# Patient Record
Sex: FEMALE | Race: WHITE | NOT HISPANIC OR LATINO | URBAN - METROPOLITAN AREA
[De-identification: names, ages, dates, MRNs, and addresses within clinical notes are randomized per-mention and may not be internally consistent; named-entity substitution may affect disease eponyms.]

---

## 2017-04-12 ENCOUNTER — THERAPY VISIT (OUTPATIENT)
Dept: PHYSICAL THERAPY | Facility: CLINIC | Age: 67
End: 2017-04-12
Payer: MEDICARE

## 2017-04-12 DIAGNOSIS — M25.551 HIP PAIN, RIGHT: Primary | ICD-10-CM

## 2017-04-12 PROCEDURE — 97162 PT EVAL MOD COMPLEX 30 MIN: CPT | Mod: GP | Performed by: PHYSICAL THERAPIST

## 2017-04-12 PROCEDURE — G8979 MOBILITY GOAL STATUS: HCPCS | Mod: GP | Performed by: PHYSICAL THERAPIST

## 2017-04-12 PROCEDURE — 97110 THERAPEUTIC EXERCISES: CPT | Mod: GP | Performed by: PHYSICAL THERAPIST

## 2017-04-12 PROCEDURE — G8978 MOBILITY CURRENT STATUS: HCPCS | Mod: GP | Performed by: PHYSICAL THERAPIST

## 2017-04-12 ASSESSMENT — ACTIVITIES OF DAILY LIVING (ADL)
HOS_ADL_COUNT: 17
KNEE_ACTIVITY_OF_DAILY_LIVING_SUM: 55
WALKING_15_MINUTES_OR_GREATER: UNABLE TO DO
HOW_WOULD_YOU_RATE_THE_OVERALL_FUNCTION_OF_YOUR_KNEE_DURING_YOUR_USUAL_DAILY_ACTIVITIES?: ABNORMAL
STEPPING_UP_AND_DOWN_CURBS: NO DIFFICULTY AT ALL
HOS_ADL_HIGHEST_POTENTIAL_SCORE: 68
KNEEL ON THE FRONT OF YOUR KNEE: ACTIVITY IS NOT DIFFICULT
PAIN: THE SYMPTOM AFFECTS MY ACTIVITY MODERATELY
STANDING_FOR_15_MINUTES: EXTREME DIFFICULTY
SIT WITH YOUR KNEE BENT: ACTIVITY IS NOT DIFFICULT
SITTING_FOR_15_MINUTES: NO DIFFICULTY AT ALL
PUTTING_ON_SOCKS_AND_SHOES: NO DIFFICULTY AT ALL
SWELLING: I DO NOT HAVE THE SYMPTOM
WEAKNESS: I DO NOT HAVE THE SYMPTOM
HOS_ADL_ITEM_SCORE_TOTAL: 37
ROLLING_OVER_IN_BED: NO DIFFICULTY AT ALL
GO UP STAIRS: ACTIVITY IS SOMEWHAT DIFFICULT
HOW_WOULD_YOU_RATE_THE_CURRENT_FUNCTION_OF_YOUR_KNEE_DURING_YOUR_USUAL_DAILY_ACTIVITIES_ON_A_SCALE_FROM_0_TO_100_WITH_100_BEING_YOUR_LEVEL_OF_KNEE_FUNCTION_PRIOR_TO_YOUR_INJURY_AND_0_BEING_THE_INABILITY_TO_PERFORM_ANY_OF_YOUR_USUAL_DAILY_ACTIVITIES?: 70
HOS_ADL_SCORE(%): 54.41
DEEP_SQUATTING: EXTREME DIFFICULTY
GOING_DOWN_1_FLIGHT_OF_STAIRS: SLIGHT DIFFICULTY
GOING_UP_1_FLIGHT_OF_STAIRS: MODERATE DIFFICULTY
AS_A_RESULT_OF_YOUR_KNEE_INJURY,_HOW_WOULD_YOU_RATE_YOUR_CURRENT_LEVEL_OF_DAILY_ACTIVITY?: ABNORMAL
SQUAT: ACTIVITY IS SOMEWHAT DIFFICULT
WALK: ACTIVITY IS SOMEWHAT DIFFICULT
KNEE_ACTIVITY_OF_DAILY_LIVING_SCORE: 78.57
WALKING_APPROXIMATELY_10_MINUTES: EXTREME DIFFICULTY
WALKING_DOWN_STEEP_HILLS: SLIGHT DIFFICULTY
WALKING_UP_STEEP_HILLS: MODERATE DIFFICULTY
GETTING_INTO_AND_OUT_OF_AN_AVERAGE_CAR: MODERATE DIFFICULTY
RECREATIONAL_ACTIVITIES: MODERATE DIFFICULTY
HEAVY_WORK: EXTREME DIFFICULTY
RISE FROM A CHAIR: ACTIVITY IS NOT DIFFICULT
GIVING WAY, BUCKLING OR SHIFTING OF KNEE: THE SYMPTOM AFFECTS MY ACTIVITY MODERATELY
LIMPING: I DO NOT HAVE THE SYMPTOM
STIFFNESS: I DO NOT HAVE THE SYMPTOM
STAND: ACTIVITY IS SOMEWHAT DIFFICULT
GETTING_INTO_AND_OUT_OF_A_BATHTUB: EXTREME DIFFICULTY
RAW_SCORE: 55
TWISTING/PIVOTING_ON_INVOLVED_LEG: NO DIFFICULTY AT ALL
LIGHT_TO_MODERATE_WORK: MODERATE DIFFICULTY
GO DOWN STAIRS: ACTIVITY IS MINIMALLY DIFFICULT
WALKING_INITIALLY: NO DIFFICULTY AT ALL

## 2017-04-12 NOTE — PROGRESS NOTES
Physical Therapy Initial Evaluation   4/17/17   Precautions/Restrictions/MD instructions:    Therapist Impression:   Pt is a 68 y/o female. Pt presents with R sacral and R hip pain and strength deficits. These impairments limit their ability to ambulate and perform transfers. Skilled PT services necessary in order to reduce impairments and improve independent function.    Subjective:   Chief Complaint: R hip/R knee pain secondary to fall 3 years ago on to concrete along R side of LE  DOI/onset: MD order: 4/6/17  DOS:   Location: R hip and R knee Quality: sharp pain   Frequency: activity dependent  Radiates: R LE   Pain scale: Rest 3/10 Activity 7/10   Time of day: no 24 hr pain pattern  Sleeping: variable    Exacerbated by: standing and walking, stairs Relieved by: sitting and rest  Progression: worsening  Previous Treatment: n/a Effect of prior treatment: n/a   PMH and/or surgical history:    Imaging:     Occupation:  Job duties:    Current HEP/exercise regimen:  Patient's goals:   Medications:   General health as reported by patient:   Return to MD:     Lumbar Spine/SIJ Evaluation:      Lumbar AROM:   Motion ROM Pain   Flexion WFL -   Extension WFL -   Side Bend L WFL -   Side Bend R WFL -   Side Gliding L WFL -   Side Gliding R WFL -         Lumbar Myotomes   L R   T12-L3 (Hip Flexion) 5/5 5/5   L2-L4 (Knee Extension) 5/5 5/5   L4 (Ankle DF) 5/5 5/5   L5 (Great Toe Ext) 5/5 5/5   S1 (Ankle PF) 5/5 5/5     Lumbar DTR's:    L R   L4 (Quad) NT NT   S1 (Achilles) NT NT     Lumbar Dermatomes: NT as patient reports no change in sensation        Hip Examination  Gait: Slight antalgic gait on R     Lumbar/SIJ Provocation: Unable to accurately assess secondary to patient apprehension with special tests     Hip PROM:    Flex  Abd  IR  ER    Left  WFL WFL WFL WFL   Right  WFL 15% loss WFL WFL     Hip Strength:    Flex  Ext  Abd  ER    Left  5/5 4+/5  4/5 5/5   Right  4/5  4/5  4-/5 5/5     Palpation: TTP along  scarum    Special Testing:   TANK MARTE Resisted SLR External De-Rot.  (Glute Med)   Right  -  - - -       Subjective:    HPI                    Objective:    System    Physical Exam    General     ROS    Assessment/Plan:      Patient is a 67 year old female with right side hip complaints.    Patient has the following significant findings with corresponding treatment plan.                Diagnosis 1:  R hip pain  Pain -  hot/cold therapy, US, manual therapy, splint/taping/bracing/orthotics, self management, education, directional preference exercise and home program  Decreased strength - therapeutic exercise and therapeutic activities  Decreased proprioception - neuro re-education and therapeutic activities  Impaired gait - gait training  Impaired muscle performance - neuro re-education  Decreased function - therapeutic activities    Therapy Evaluation Codes:   1) History comprised of:   Personal factors that impact the plan of care:      Time since onset of symptoms.    Comorbidity factors that impact the plan of care are:      High blood pressure, Osteoarthritis and Overweight.     Medications impacting care: High blood pressure.  2) Examination of Body Systems comprised of:   Body structures and functions that impact the plan of care:      Hip, Knee, Pelvis and Sacral illiac joint.   Activity limitations that impact the plan of care are:      Bending, Squatting/kneeling, Standing and Walking.  3) Clinical presentation characteristics are:   Evolving/Changing.  4) Decision-Making    Moderate complexity using standardized patient assessment instrument and/or measureable assessment of functional outcome.  Cumulative Therapy Evaluation is: Moderate complexity.    Previous and current functional limitations:  (See Goal Flow Sheet for this information)    Short term and Long term goals: (See Goal Flow Sheet for this information)     Communication ability:  Patient appears to be able to clearly communicate and  understand verbal and written communication and follow directions correctly.  Treatment Explanation - The following has been discussed with the patient:   RX ordered/plan of care  Anticipated outcomes  Possible risks and side effects  This patient would benefit from PT intervention to resume normal activities.   Rehab potential is good.    Frequency:  1 X week, once daily  Duration:  for 8 weeks  Discharge Plan:  Achieve all LTG.  Independent in home treatment program.  Reach maximal therapeutic benefit.    Please refer to the daily flowsheet for treatment today, total treatment time and time spent performing 1:1 timed codes.

## 2017-04-12 NOTE — LETTER
[facility]TH[facility] PHYSICAL THERAPY  [address] [place] [address]  [phone]    2017    Re: Colleen Trivedi   :   1950  MRN:  [id number]   REFERRING PHYSICIAN:   Yoni Brandon    [facility] ATHLETIC [facility] PHYSICAL [facility]    Date of Initial Evaluation:  2017  Visits:  Rxs Used: 1  Reason for Referral:  Hip pain, right  Physical Therapy Initial Evaluation   17   Precautions/Restrictions/MD instructions:    Therapist Impression:   Pt is a 66 y/o female. Pt presents with R sacral and R hip pain and strength deficits. These impairments limit their ability to ambulate and perform transfers. Skilled PT services necessary in order to reduce impairments and improve independent function.    Subjective:   Chief Complaint: R hip/R knee pain secondary to fall 3 years ago on to concrete along R side of LE  DOI/onset: MD order: 17  DOS:   Location: R hip and R knee Quality: sharp pain   Frequency: activity dependent  Radiates: R LE   Pain scale: Rest 3/10 Activity 7/10   Time of day: no 24 hr pain pattern  Sleeping: variable    Exacerbated by: standing and walking, stairs Relieved by: sitting and rest  Progression: worsening  Previous Treatment: n/a Effect of prior treatment: n/a   PMH and/or surgical history:    Imaging:     Occupation:  Job duties:    Current HEP/exercise regimen:  Patient's goals:   Medications:   General health as reported by patient:   Return to MD:   Patient is a 67 year old female presenting with rehab left ankle/foot hpi.                                      Pertinent medical history includes:  Osteoarthritis, overweight and high blood pressure.  Medical allergies: other.  Other surgeries include:  Other.  Current medications:  High blood pressure medication.           Knee Activity of Daily Living Score: 78.57     Lumbar Spine/SIJ Evaluation:      Lumbar AROM:   Motion ROM Pain   Flexion WFL -    Extension WFL -   Side Bend L WFL -   Side Bend R WFL -   Side Gliding L WFL -   Side Gliding R WFL -     Lumbar Myotomes   L R   T12-L3 (Hip Flexion) 5/5 5/5   L2-L4 (Knee Extension) 5/5 5/5   L4 (Ankle DF) 5/5 5/5   L5 (Great Toe Ext) 5/5 5/5   S1 (Ankle PF) 5/5 5/5     Lumbar DTR's:    L R   L4 (Quad) NT NT   S1 (Achilles) NT NT     Lumbar Dermatomes: NT as patient reports no change in sensation    Hip Examination  Gait: Slight antalgic gait on R   Lumbar/SIJ Provocation: Unable to accurately assess secondary to patient apprehension with special tests     Hip PROM:    Flex  Abd  IR  ER    Left  WFL WFL WFL WFL   Right  WFL 15% loss WFL WFL     Hip Strength:    Flex  Ext  Abd  ER    Left  5/5 4+/5  4/5 5/5   Right  4/5  4/5  4-/5 5/5     Palpation: TTP along scarum    Special Testing:   TANK MARTE Resisted SLR External De-Rot.  (Glute Med)   Right  -  - - -     Assessment/Plan:      Patient is a 67 year old female with right side hip complaints.    Patient has the following significant findings with corresponding treatment plan.                Diagnosis 1:  R hip pain  Pain -  hot/cold therapy, US, manual therapy, splint/taping/bracing/orthotics, self management, education, directional preference exercise and home program  Decreased strength - therapeutic exercise and therapeutic activities  Decreased proprioception - neuro re-education and therapeutic activities  Impaired gait - gait training  Impaired muscle performance - neuro re-education  Decreased function - therapeutic activities    Therapy Evaluation Codes:   1) History comprised of:   Personal factors that impact the plan of care:      Time since onset of symptoms.    Comorbidity factors that impact the plan of care are:      High blood pressure, Osteoarthritis and Overweight.     Medications impacting care: High blood pressure.  2) Examination of Body Systems comprised of:   Body structures and functions that impact the plan of care:      Hip, Knee,  Pelvis and Sacral illiac joint.   Activity limitations that impact the plan of care are:      Bending, Squatting/kneeling, Standing and Walking.  3) Clinical presentation characteristics are:   Evolving/Changing.  4) Decision-Making    Moderate complexity using standardized patient assessment instrument and/or measureable assessment of functional outcome.  Cumulative Therapy Evaluation is: Moderate complexity.    Previous and current functional limitations:  (See Goal Flow Sheet for this information)    Short term and Long term goals: (See Goal Flow Sheet for this information)     Communication ability:  Patient appears to be able to clearly communicate and understand verbal and written communication and follow directions correctly.  Treatment Explanation - The following has been discussed with the patient:   RX ordered/plan of care  Anticipated outcomes  Possible risks and side effects  This patient would benefit from PT intervention to resume normal activities.   Rehab potential is good.    Frequency:  1 X week, once daily  Duration:  for 8 weeks  Discharge Plan:  Achieve all LTG.  Independent in home treatment program.  Reach maximal therapeutic benefit.    Thank you for your referral.    INQUIRIES  Therapist: Chaz Conrad PT  [facility] PHYSICAL THERAPY  [address] [place] [address]  Phone: [phone]  Fax: [phone]

## 2017-04-12 NOTE — MR AVS SNAPSHOT
"              After Visit Summary   4/12/2017    Colleen Trivedi    MRN: [id number]           Patient Information     Date Of Birth          1950        Visit Information        Provider Department      4/12/2017 9:30 AM Chaz Conrad PT [facility] Physical Therapy        Today's Diagnoses     Hip pain, right    -  1       Follow-ups after your visit        Your next 10 appointments already scheduled     Apr 19, 2017  9:30 AM CDT   NAZ Extremity with Chaz Conrad PT   [facility] Physical Therapy ([facility]  )    [address] [place] [address] [phone]            Apr 26, 2017 12:00 PM CDT   NAZ Extremity with Chaz Conrad PT   [facility] Physical Therapy ([address] [address] [phone]              Who to contact     If you have questions or need follow up information about today's clinic visit or your schedule please contact [facility] PHYSICAL THERAPY directly at [phone].  Normal or non-critical lab and imaging results will be communicated to you by Holidoghart, letter or phone within 4 business days after the clinic has received the results. If you do not hear from us within 7 days, please contact the clinic through Holidoghart or phone. If you have a critical or abnormal lab result, we will notify you by phone as soon as possible.  Submit refill requests through GamyTech or call your pharmacy and they will forward the refill request to us. Please allow 3 business days for your refill to be completed.          Additional Information About Your Visit        MyChart Information     GamyTech lets you send messages to your doctor, view your test results, renew your prescriptions, schedule appointments and more. To sign up, go to [email or web address] . Click on \"Log in\" on the left side of the screen, " "which will take you to the Welcome page. Then click on \"Sign up Now\" on the right side of the page.     You will be asked to enter the access code listed below, as well as some personal information. Please follow the directions to create your username and password.     Your access code is: [email or web address]  Expires: 2017  8:51 AM     Your access code will  in 90 days. If you need help or a new code, please call your [place] clinic or [phone].        Care EveryWhere ID     This is your Care EveryWhere ID. This could be used by other organizations to access your [place] medical records  [id number]         Blood Pressure from Last 3 Encounters:   05 110/82    Weight from Last 3 Encounters:   05 96.6 kg (213 lb)              We Performed the Following     HC PT EVAL, MODERATE COMPLEXITY     NAZ CERT REPORT     NAZ INITIAL EVAL REPORT     THERAPEUTIC EXERCISES        Primary Care Provider    None Doctor, MD       No address on file        Thank you!     Thank you for choosing [place] [facility] PHYSICAL THERAPY  for your care. Our goal is always to provide you with excellent care. Hearing back from our patients is one way we can continue to improve our services. Please take a few minutes to complete the written survey that you may receive in the mail after your visit with us. Thank you!             Your Updated Medication List - Protect others around you: Learn how to safely use, store and throw away your medicines at www.disposemymeds.org.          This list is accurate as of: 17 11:59 PM.  Always use your most recent med list.                   Brand Name Dispense Instructions for use    labetalol 100 MG tablet    NORMODYNE     Not sure of dosage       LIPITOR 10 MG tablet   Generic drug:  atorvastatin      1 tab PO QD (Once per day)       NORVASC 5 MG tablet   Generic drug:  amLODIPine      Not sure of dosage       triamterene 50 MG capsule    DYRENIUM     Not " sure of dosage

## 2017-04-12 NOTE — LETTER
DEPARTMENT OF HEALTH AND HUMAN SERVICES  CENTERS FOR MEDICARE & MEDICAID SERVICES    PLAN/UPDATED PLAN OF PROGRESS FOR OUTPATIENT REHABILITATION    PATIENTS NAME:  Colleen Trivedi     : 1950    PROVIDER NUMBER:    [id number]    [id number]     PROVIDER NAME: [facility] PHYSICAL THERAPY    MEDICAL RECORD NUMBER: [id number]     START OF CARE DATE:  SOC Date: 17   TYPE:  PT    PRIMARY/TREATMENT DIAGNOSIS: (Pertinent Medical Diagnosis)  Hip pain, right    VISITS FROM START OF CARE:  Rxs Used: 1          Physical Therapy Initial Evaluation    Precautions/Restrictions/MD instructions:    Therapist Impression:   Pt is a 68 y/o female. Pt presents with R sacral and R hip pain and strength deficits. These impairments limit their ability to ambulate and perform transfers. Skilled PT services necessary in order to reduce impairments and improve independent function.  Subjective:   Chief Complaint: R hip/R knee pain secondary to fall 3 years ago on to concrete along R side of LE  DOI/onset: MD order: 17  DOS:   Location: R hip and R knee Quality: sharp pain   Frequency: activity dependent  Radiates: R LE   Pain scale: Rest 3/10 Activity 7/10   Time of day: no 24 hr pain pattern  Sleeping: variable    Exacerbated by: standing and walking, stairs Relieved by: sitting and rest  Progression: worsening  Previous Treatment: n/a Effect of prior treatment: n/a   PMH and/or surgical history:    Patient is a 67 year old female presenting with rehab left ankle/foot hpi.   Pertinent medical history includes:  Osteoarthritis, overweight and high blood pressure.  Medical allergies: other.  Other surgeries include:  Other.  Current medications:  High blood pressure medication.      Knee Activity of Daily Living Score: 78.57             PATIENTS NAME:  Colleen Trivedi         Lumbar Spine/SIJ Evaluation:      Lumbar AROM:   Motion ROM Pain   Flexion WFL -   Extension WFL -   Side Bend L  WFL -   Side Bend R WFL -   Side Gliding L WFL -   Side Gliding R WFL -       Lumbar Myotomes   L R   T12-L3 (Hip Flexion) 5/5 5/5   L2-L4 (Knee Extension) 5/5 5/5   L4 (Ankle DF) 5/5 5/5   L5 (Great Toe Ext) 5/5 5/5   S1 (Ankle PF) 5/5 5/5     Lumbar DTR's:    L R   L4 (Quad) NT NT   S1 (Achilles) NT NT     Lumbar Dermatomes: NT as patient reports no change in sensation      Hip Examination  Gait: Slight antalgic gait on R   Lumbar/SIJ Provocation: Unable to accurately assess secondary to patient apprehension with special tests   Hip PROM:    Flex  Abd  IR  ER    Left  WFL WFL WFL WFL   Right  WFL 15% loss WFL WFL           PATIENTS NAME:  Colleen Trivedi   Hip Strength:    Flex  Ext  Abd  ER    Left  5/5 4+/5  4/5 5/5   Right  4/5  4/5  4-/5 5/5     Palpation: TTP along scarum    Special Testing:   TANK MARTE Resisted SLR External De-Rot.  (Glute Med)   Right  -  - - -       Assessment/Plan:    Patient is a 67 year old female with right side hip complaints.    Patient has the following significant findings with corresponding treatment plan.                Diagnosis 1:  R hip pain  Pain -  hot/cold therapy, US, manual therapy, splint/taping/bracing/orthotics, self management, education, directional preference exercise and home program  Decreased strength - therapeutic exercise and therapeutic activities  Decreased proprioception - neuro re-education and therapeutic activities  Impaired gait - gait training  Impaired muscle performance - neuro re-education  Decreased function - therapeutic activities    Therapy Evaluation Codes:   1) History comprised of:   Personal factors that impact the plan of care:      Time since onset of symptoms.    Comorbidity factors that impact the plan of care are:      High blood pressure, Osteoarthritis and Overweight.     Medications impacting care: High blood pressure.  2) Examination of Body Systems comprised of:   Body structures and functions that impact the plan of care:   "    Hip, Knee, Pelvis and Sacral illiac joint.   Activity limitations that impact the plan of care are:      Bending, Squatting/kneeling, Standing and Walking.  3) Clinical presentation characteristics are:   Evolving/Changing.  4) Decision-Making    Moderate complexity using standardized patient assessment instrument and/or measureable assessment of functional outcome.  Cumulative Therapy Evaluation is: Moderate complexity.    Previous and current functional limitations:  (See Goal Flow Sheet for this information)    Short term and Long term goals: (See Goal Flow Sheet for this information)     Communication ability:  Patient appears to be able to clearly communicate and understand verbal and written communication and follow directions correctly.  Treatment Explanation - The following has been discussed with the patient:   RX ordered/plan of care  Anticipated outcomes  Possible risks and side effects  This patient would benefit from PT intervention to resume normal activities.   Rehab potential is good.    Frequency:  1 X week, once daily  Duration:  for 8 weeks  Discharge Plan:  Achieve all LTG.  Independent in home treatment program.  Reach maximal therapeutic benefit.    Please refer to the daily flowsheet for treatment today, total treatment time and time spent performing 1:1 timed codes.     Caregiver Signature/Credentials _____________________________ Date ________       Treating Provider: Chaz Conrad PT   I have reviewed and certified the need for these services and plan of treatment while under my care.      PHYSICIAN'S SIGNATURE:   _________________________________________  Date___________   Yoni Brandon    Certification period:  Beginning of Cert date period: 04/12/17 to  End of Cert period date: 07/10/17     Functional Level Progress Report: Please see attached \"Goal Flow sheet for Functional level.\"    ____X____ Continue Services or       ________ DC Services                Service dates: From "  SOC Date: 04/12/17 date to present

## 2017-04-17 PROBLEM — M25.551 HIP PAIN, RIGHT: Status: ACTIVE | Noted: 2017-04-17

## 2017-04-19 ENCOUNTER — THERAPY VISIT (OUTPATIENT)
Dept: PHYSICAL THERAPY | Facility: CLINIC | Age: 67
End: 2017-04-19
Payer: MEDICARE

## 2017-04-19 DIAGNOSIS — M25.551 HIP PAIN, RIGHT: ICD-10-CM

## 2017-04-19 PROCEDURE — 97110 THERAPEUTIC EXERCISES: CPT | Mod: GP | Performed by: PHYSICAL THERAPIST

## 2017-04-19 PROCEDURE — 97140 MANUAL THERAPY 1/> REGIONS: CPT | Mod: GP | Performed by: PHYSICAL THERAPIST

## 2017-04-19 PROCEDURE — 97035 APP MDLTY 1+ULTRASOUND EA 15: CPT | Mod: GP | Performed by: PHYSICAL THERAPIST

## 2017-04-26 ENCOUNTER — THERAPY VISIT (OUTPATIENT)
Dept: PHYSICAL THERAPY | Facility: CLINIC | Age: 67
End: 2017-04-26
Payer: MEDICARE

## 2017-04-26 DIAGNOSIS — M25.551 HIP PAIN, RIGHT: ICD-10-CM

## 2017-04-26 PROCEDURE — 97110 THERAPEUTIC EXERCISES: CPT | Mod: GP | Performed by: PHYSICAL THERAPIST

## 2017-04-26 PROCEDURE — 97035 APP MDLTY 1+ULTRASOUND EA 15: CPT | Mod: GP | Performed by: PHYSICAL THERAPIST

## 2017-04-26 PROCEDURE — 97140 MANUAL THERAPY 1/> REGIONS: CPT | Mod: GP | Performed by: PHYSICAL THERAPIST

## 2017-05-03 ENCOUNTER — THERAPY VISIT (OUTPATIENT)
Dept: PHYSICAL THERAPY | Facility: CLINIC | Age: 67
End: 2017-05-03
Payer: MEDICARE

## 2017-05-03 DIAGNOSIS — M25.551 HIP PAIN, RIGHT: ICD-10-CM

## 2017-05-03 PROCEDURE — 97110 THERAPEUTIC EXERCISES: CPT | Mod: GP | Performed by: PHYSICAL THERAPIST

## 2017-05-03 PROCEDURE — 97140 MANUAL THERAPY 1/> REGIONS: CPT | Mod: GP | Performed by: PHYSICAL THERAPIST

## 2017-05-10 ENCOUNTER — THERAPY VISIT (OUTPATIENT)
Dept: PHYSICAL THERAPY | Facility: CLINIC | Age: 67
End: 2017-05-10
Payer: MEDICARE

## 2017-05-10 DIAGNOSIS — M25.551 HIP PAIN, RIGHT: ICD-10-CM

## 2017-05-10 PROCEDURE — 97140 MANUAL THERAPY 1/> REGIONS: CPT | Mod: GP | Performed by: PHYSICAL THERAPIST

## 2017-05-10 PROCEDURE — 97110 THERAPEUTIC EXERCISES: CPT | Mod: GP | Performed by: PHYSICAL THERAPIST

## 2017-05-10 NOTE — MR AVS SNAPSHOT
"              After Visit Summary   5/10/2017    Colleen Trivedi    MRN: [id number]           Patient Information     Date Of Birth          1950        Visit Information        Provider Department      5/10/2017 10:50 AM Chaz Conrad, PT [facility] Physical Therapy        Today's Diagnoses     Hip pain, right           Follow-ups after your visit        Your next 10 appointments already scheduled     May 17, 2017 12:00 PM CDT   NAZ Extremity with Jana Russ PTA   [facility] Physical Therapy ([facility]  )    [address] [place] [address] [phone]              Who to contact     If you have questions or need follow up information about today's clinic visit or your schedule please contact [facility] ATHLETIC [facility] PHYSICAL [facility] directly at [phone].  Normal or non-critical lab and imaging results will be communicated to you by MyChart, letter or phone within 4 business days after the clinic has received the results. If you do not hear from us within 7 days, please contact the clinic through SportsBeephart or phone. If you have a critical or abnormal lab result, we will notify you by phone as soon as possible.  Submit refill requests through Locally or call your pharmacy and they will forward the refill request to us. Please allow 3 business days for your refill to be completed.          Additional Information About Your Visit        MyChart Information     Locally lets you send messages to your doctor, view your test results, renew your prescriptions, schedule appointments and more. To sign up, go to [email or web address] . Click on \"Log in\" on the left side of the screen, which will take you to the Welcome page. Then click on \"Sign up Now\" on the right side of the page.     You will be asked to enter the access code listed below, as well as some personal information. Please follow the directions " to create your username and password.     Your access code is: [email or web address]  Expires: 2017  8:51 AM     Your access code will  in 90 days. If you need help or a new code, please call your [place] clinic or [phone].        Care EveryWhere ID     This is your Care EveryWhere ID. This could be used by other organizations to access your [place] medical records  [id number]         Blood Pressure from Last 3 Encounters:   05 110/82    Weight from Last 3 Encounters:   05 96.6 kg (213 lb)              We Performed the Following     MANUAL THER TECH,1+REGIONS,EA 15 MIN     THERAPEUTIC EXERCISES        Primary Care Provider    None Doctor, MD       No address on file        Thank you!     Thank you for choosing [place] [facility] PHYSICAL THERAPY  for your care. Our goal is always to provide you with excellent care. Hearing back from our patients is one way we can continue to improve our services. Please take a few minutes to complete the written survey that you may receive in the mail after your visit with us. Thank you!             Your Updated Medication List - Protect others around you: Learn how to safely use, store and throw away your medicines at www.disposemymeds.org.          This list is accurate as of: 5/10/17 11:46 AM.  Always use your most recent med list.                   Brand Name Dispense Instructions for use    labetalol 100 MG tablet    NORMODYNE     Not sure of dosage       LIPITOR 10 MG tablet   Generic drug:  atorvastatin      1 tab PO QD (Once per day)       NORVASC 5 MG tablet   Generic drug:  amLODIPine      Not sure of dosage       triamterene 50 MG capsule    DYRENIUM     Not sure of dosage

## 2017-05-17 ENCOUNTER — THERAPY VISIT (OUTPATIENT)
Dept: PHYSICAL THERAPY | Facility: CLINIC | Age: 67
End: 2017-05-17
Payer: MEDICARE

## 2017-05-17 DIAGNOSIS — M25.551 HIP PAIN, RIGHT: ICD-10-CM

## 2017-05-17 PROCEDURE — 97110 THERAPEUTIC EXERCISES: CPT | Mod: GP

## 2017-05-17 PROCEDURE — 97140 MANUAL THERAPY 1/> REGIONS: CPT | Mod: GP

## 2017-05-17 NOTE — MR AVS SNAPSHOT
"              After Visit Summary   5/17/2017    Colleen Trivedi    MRN: [id number]           Patient Information     Date Of Birth          1950        Visit Information        Provider Department      5/17/2017 12:00 PM Jana Russ PTA [facility] Physical Therapy        Today's Diagnoses     Hip pain, right           Follow-ups after your visit        Your next 10 appointments already scheduled     May 23, 2017  9:30 AM CDT   NAZ Extremity with Jana Russ PTA   [facility] Physical Therapy ([facility]  )    [address] [place] [address] [phone]              Who to contact     If you have questions or need follow up information about today's clinic visit or your schedule please contact [facility] PHYSICAL THERAPY directly at [phone].  Normal or non-critical lab and imaging results will be communicated to you by Vlingohart, letter or phone within 4 business days after the clinic has received the results. If you do not hear from us within 7 days, please contact the clinic through Vlingohart or phone. If you have a critical or abnormal lab result, we will notify you by phone as soon as possible.  Submit refill requests through Timescape or call your pharmacy and they will forward the refill request to us. Please allow 3 business days for your refill to be completed.          Additional Information About Your Visit        MyChart Information     Timescape lets you send messages to your doctor, view your test results, renew your prescriptions, schedule appointments and more. To sign up, go to [email or web address] . Click on \"Log in\" on the left side of the screen, which will take you to the Welcome page. Then click on \"Sign up Now\" on the right side of the page.     You will be asked to enter the access code listed below, as well as some personal information. Please follow the directions to " create your username and password.     Your access code is: [email or web address]  Expires: 2017  8:51 AM     Your access code will  in 90 days. If you need help or a new code, please call your [place] clinic or [phone].        Care EveryWhere ID     This is your Care EveryWhere ID. This could be used by other organizations to access your [place] medical records  [id number]         Blood Pressure from Last 3 Encounters:   05 110/82    Weight from Last 3 Encounters:   05 96.6 kg (213 lb)              We Performed the Following     Manual Ther Tech, 1+Regions, EA 15 min     Therapeutic Exercises        Primary Care Provider    None Doctor, MD       No address on file        Thank you!     Thank you for choosing [place] PHYSICAL THERAPY  for your care. Our goal is always to provide you with excellent care. Hearing back from our patients is one way we can continue to improve our services. Please take a few minutes to complete the written survey that you may receive in the mail after your visit with us. Thank you!             Your Updated Medication List - Protect others around you: Learn how to safely use, store and throw away your medicines at www.disposemymeds.org.          This list is accurate as of: 17 11:59 PM.  Always use your most recent med list.                   Brand Name Dispense Instructions for use    labetalol 100 MG tablet    NORMODYNE     Not sure of dosage       LIPITOR 10 MG tablet   Generic drug:  atorvastatin      1 tab PO QD (Once per day)       NORVASC 5 MG tablet   Generic drug:  amLODIPine      Not sure of dosage       triamterene 50 MG capsule    DYRENIUM     Not sure of dosage

## 2017-05-18 NOTE — PROGRESS NOTES
Subjective:    HPI                    Objective:    System    Physical Exam    General     ROS    Assessment/Plan:      SUBJECTIVE  Subjective changes as noted by pt:   Feeling better overall. Still has times when she needs to sit to get weight off her feet after standing 10-15 minutes, harder on tile than carpet. Pain located at R SI region   Current pain level: 3/10 Current Pain level: 3/10   Changes in function:  Yes (See Goal flowsheet attached for changes in current functional level)     Adverse reaction to treatment or activity:  None    OBJECTIVE  Changes in objective findings:  Yes,   Objective: Mod tender R SI joint region, after TP pressure applied, pain is resolved. Relief with inf sacral tilt. Weak glutes and adominals 4-/5 strength.     ASSESSMENT  Colleen continues to require intervention to meet STG and LTG's: PT  Patient is progressing as expected.  Response to therapy has shown an improvement in  pain level, muscle control and function  Progress made towards STG/LTG?  Yes (See Goal flowsheet attached for updates on achievement of STG and LTG)    PLAN  Current treatment program is being advanced to more complex exercises.    PTA/ATC plan:  Will continue with present plan of care.    Please refer to the daily flowsheet for treatment today, total treatment time and time spent performing 1:1 timed codes.

## 2017-05-23 ENCOUNTER — THERAPY VISIT (OUTPATIENT)
Dept: PHYSICAL THERAPY | Facility: CLINIC | Age: 67
End: 2017-05-23
Payer: MEDICARE

## 2017-05-23 DIAGNOSIS — M25.551 HIP PAIN, RIGHT: ICD-10-CM

## 2017-05-23 PROCEDURE — 97110 THERAPEUTIC EXERCISES: CPT | Mod: GP

## 2017-05-23 PROCEDURE — 97140 MANUAL THERAPY 1/> REGIONS: CPT | Mod: GP

## 2017-05-23 PROCEDURE — 97035 APP MDLTY 1+ULTRASOUND EA 15: CPT | Mod: GP

## 2017-05-23 NOTE — LETTER
[facility] PHYSICAL THERAPY   [address] [address]  [phone]    May 23, 2017    Re: Colleen Trivedi   :   1950  MRN:  [id number]   REFERRING PHYSICIAN:   Yoni Brandon    [facility] PHYSICAL [facility]    Date of Initial Evaluation:  2017  Visits:  Rxs Used: 7  Reason for Referral:  Hip pain, right      PROGRESS  REPORT    Progress reporting period is from 17 to 17.       SUBJECTIVE  Subjective changes noted by patient:   Pt was sore for full day after PT with R SI pain; but was able to sleep well that night. Using pillow between knees really helps.     Current pain level is 4/10  .     Previous pain level was  7/10 Initial Pain level: 7/10.   Changes in function:  None  Adverse reaction to treatment or activity: None    OBJECTIVE  Changes noted in objective findings:    Objective: Sig tender at R SI joint although difficult to palpate, mod tender R L5 area. Poor tolerance to ant tilted position, pain relieved in posterior pelvic tilt position. Good glute contraction, abdom set good- had difficulty breathing effectively with abdominal contraction. Difficult to perform quad, abdom exercise due to easily irritated R SI pain.      ASSESSMENT/PLAN  Updated problem list and treatment plan: Diagnosis 1:  R SI joint pain  Pain -  hot/cold therapy, US, manual therapy, splint/taping/bracing/orthotics, education and directional preference exercise  Decreased strength - therapeutic exercise, therapeutic activities and home program  STG/LTGs have been met or progress has been made towards goals:  Yes (See Goal flow sheet completed today.) and None  Assessment of Progress: The patient's condition has potential to improve.  Self Management Plans:  Patient has been instructed in a home treatment program.  I have re-evaluated this patient and find that the nature, scope, duration and intensity of the therapy is  appropriate for the medical condition of the patient.  Colleen continues to require the following intervention to meet STG and LTG's:  PT    Recommendations:  This patient would benefit from continued therapy.     Frequency:  1 X week, once daily  Duration:  for 4 more visits if pain can be controlled in order to progress glute, abdominal, core strengthening. Encouraged pt to seek other pain relieving techniques - acupuncture, hot pack, trigger point release, further MD back      This patient would benefit from further evaluation.  The progress note/discharge summary was written in collaboration with and reviewed by the physical therapist.    Thank you for your referral.    INQUIRIES  Therapist: Jana Russ ATC, [facility]  [facility] PHYSICAL THERAPY  [address] [place] [id number]  Phone: [phone]  Fax: [phone]

## 2017-05-23 NOTE — MR AVS SNAPSHOT
"              After Visit Summary   5/23/2017    Colleen Trivedi    MRN: [id number]           Patient Information     Date Of Birth          1950        Visit Information        Provider Department      5/23/2017 9:30 AM Jana Russ PTA [facility] Physical Therapy        Today's Diagnoses     Hip pain, right           Follow-ups after your visit        Your next 10 appointments already scheduled     May 31, 2017  8:50 AM CDT   NAZ Extremity with Jana Russ PTA   [facility] Physical Therapy ([facility]  )    [address] [place] [address] [phone]              Who to contact     If you have questions or need follow up information about today's clinic visit or your schedule please contact [facility]THLETIC [facility] PHYSICAL THERAPY directly at [phone].  Normal or non-critical lab and imaging results will be communicated to you by Power OLEDshart, letter or phone within 4 business days after the clinic has received the results. If you do not hear from us within 7 days, please contact the clinic through Power OLEDshart or phone. If you have a critical or abnormal lab result, we will notify you by phone as soon as possible.  Submit refill requests through Skai or call your pharmacy and they will forward the refill request to us. Please allow 3 business days for your refill to be completed.          Additional Information About Your Visit        MyChart Information     Skai lets you send messages to your doctor, view your test results, renew your prescriptions, schedule appointments and more. To sign up, go to [email or web address] . Click on \"Log in\" on the left side of the screen, which will take you to the Welcome page. Then click on \"Sign up Now\" on the right side of the page.     You will be asked to enter the access code listed below, as well as some personal information. Please follow the directions to " create your username and password.     Your access code is: [email or web address]  Expires: 2017  8:51 AM     Your access code will  in 90 days. If you need help or a new code, please call your [place] clinic or [phone].        Care EveryWhere ID     This is your Care EveryWhere ID. This could be used by other organizations to access your [place] medical records  [id number]         Blood Pressure from Last 3 Encounters:   05 110/82    Weight from Last 3 Encounters:   05 96.6 kg (213 lb)              We Performed the Following     NAZ Progress Notes Report     Manual Ther Tech, 1+Regions, EA 15 min     Therapeutic Exercises     Ultrasound Therapy        Primary Care Provider    None Doctor, MD       No address on file        Thank you!     Thank you for choosing [place] [facility] PHYSICAL THERAPY  for your care. Our goal is always to provide you with excellent care. Hearing back from our patients is one way we can continue to improve our services. Please take a few minutes to complete the written survey that you may receive in the mail after your visit with us. Thank you!             Your Updated Medication List - Protect others around you: Learn how to safely use, store and throw away your medicines at www.disposemymeds.org.          This list is accurate as of: 17 10:18 AM.  Always use your most recent med list.                   Brand Name Dispense Instructions for use    labetalol 100 MG tablet    NORMODYNE     Not sure of dosage       LIPITOR 10 MG tablet   Generic drug:  atorvastatin      1 tab PO QD (Once per day)       NORVASC 5 MG tablet   Generic drug:  amLODIPine      Not sure of dosage       triamterene 50 MG capsule    DYRENIUM     Not sure of dosage

## 2017-05-23 NOTE — PROGRESS NOTES
Subjective:    HPI                    Objective:    System    Physical Exam    General     ROS    Assessment/Plan:      PROGRESS  REPORT    Progress reporting period is from 4/12/17 to 5/23/17.       SUBJECTIVE  Subjective changes noted by patient:   Pt was sore for full day after PT with R SI pain; but was able to sleep well that night. Using pillow between knees really helps.     Current pain level is 4/10  .     Previous pain level was  7/10 Initial Pain level: 7/10.   Changes in function:  None  Adverse reaction to treatment or activity: None    OBJECTIVE  Changes noted in objective findings:    Objective: Sig tender at R SI joint although difficult to palpate, mod tender R L5 area. Poor tolerance to ant tilted position, pain relieved in posterior pelvic tilt position. Good glute contraction, abdom set good- had difficulty breathing effectively with abdominal contraction. Difficult to perform quad, abdom exercise due to easily irritated R SI pain.      ASSESSMENT/PLAN  Updated problem list and treatment plan: Diagnosis 1:  R SI joint pain  Pain -  hot/cold therapy, US, manual therapy, splint/taping/bracing/orthotics, education and directional preference exercise  Decreased strength - therapeutic exercise, therapeutic activities and home program  STG/LTGs have been met or progress has been made towards goals:  Yes (See Goal flow sheet completed today.) and None  Assessment of Progress: The patient's condition has potential to improve.  Self Management Plans:  Patient has been instructed in a home treatment program.  I have re-evaluated this patient and find that the nature, scope, duration and intensity of the therapy is appropriate for the medical condition of the patient.  Colleen continues to require the following intervention to meet STG and LTG's:  PT    Recommendations:  This patient would benefit from continued therapy.     Frequency:  1 X week, once daily  Duration:  for 4 more visits if pain can be  controlled in order to progress glute, abdominal, core strengthening. Encouraged pt to seek other pain relieving techniques - acupuncture, hot pack, trigger point release, further MD back      This patient would benefit from further evaluation.  The progress note/discharge summary was written in collaboration with and reviewed by the physical therapist.    Please refer to the daily flowsheet for treatment today, total treatment time and time spent performing 1:1 timed codes.

## 2017-05-31 ENCOUNTER — THERAPY VISIT (OUTPATIENT)
Dept: PHYSICAL THERAPY | Facility: CLINIC | Age: 67
End: 2017-05-31
Payer: MEDICARE

## 2017-05-31 DIAGNOSIS — M25.551 HIP PAIN, RIGHT: Primary | ICD-10-CM

## 2017-05-31 PROCEDURE — 97110 THERAPEUTIC EXERCISES: CPT | Mod: GP

## 2017-05-31 PROCEDURE — 97035 APP MDLTY 1+ULTRASOUND EA 15: CPT | Mod: GP

## 2017-05-31 PROCEDURE — 97012 MECHANICAL TRACTION THERAPY: CPT | Mod: GP

## 2017-06-07 ENCOUNTER — THERAPY VISIT (OUTPATIENT)
Dept: PHYSICAL THERAPY | Facility: CLINIC | Age: 67
End: 2017-06-07
Payer: MEDICARE

## 2017-06-07 DIAGNOSIS — M25.551 HIP PAIN, RIGHT: ICD-10-CM

## 2017-06-07 PROCEDURE — 97110 THERAPEUTIC EXERCISES: CPT | Mod: GP

## 2017-06-07 PROCEDURE — 97035 APP MDLTY 1+ULTRASOUND EA 15: CPT | Mod: GP

## 2017-06-07 PROCEDURE — 97012 MECHANICAL TRACTION THERAPY: CPT | Mod: GP

## 2017-06-21 ENCOUNTER — THERAPY VISIT (OUTPATIENT)
Dept: PHYSICAL THERAPY | Facility: CLINIC | Age: 67
End: 2017-06-21
Payer: MEDICARE

## 2017-06-21 DIAGNOSIS — M25.551 HIP PAIN, RIGHT: ICD-10-CM

## 2017-06-21 PROCEDURE — 97110 THERAPEUTIC EXERCISES: CPT | Mod: GP

## 2017-06-21 PROCEDURE — 97012 MECHANICAL TRACTION THERAPY: CPT | Mod: GP

## 2017-06-21 PROCEDURE — G8979 MOBILITY GOAL STATUS: HCPCS | Mod: GP

## 2017-06-21 PROCEDURE — 97035 APP MDLTY 1+ULTRASOUND EA 15: CPT | Mod: GP

## 2017-06-21 PROCEDURE — G8978 MOBILITY CURRENT STATUS: HCPCS | Mod: GP

## 2017-06-21 NOTE — LETTER
[facility] ATH[facility]IC [facility] PHYSICAL THERAPY   [address] [address]  [phone]    2017    Re: Colleen Trivedi   :   1950  MRN:  [id number]   REFERRING PHYSICIAN:   Yoni Brandon    [facility]THLETIC [facility] PHYSICAL [facility]  Date of Initial Evaluation:    17  Visits:  Rxs Used: 10  Reason for Referral:  Hip pain, right    PROGRESS  REPORT  Progress reporting period is from 2017 to 2017.       SUBJECTIVE  Subjective changes noted by patient:  Subjective: Noticed that 2-3 days last week were bad after missing PT last week. Yesterday was bad to walk 2 blocks, today better.    .      Initial Pain level: 7/10.   Changes in function:  Yes (See Goal flowsheet attached for changes in current functional level)  Adverse reaction to treatment or activity: None    OBJECTIVE  Changes noted in objective findings:    R hamstring 4/5, L 4/5, GLute max 4/5 each.   Difficulty finding posterior pelvic tilt position in 90-90 position.   Best result with R flexion stretches   LUmbar flexion hands to floor, exten WNL, R SB min+ restricted due to R SI region pinching. R rot min restricte. Pt reluctant to see chiro for adjustments       ASSESSMENT/PLAN  Updated problem list and treatment plan: Diagnosis 1:  R hip pain  Pain -  hot/cold therapy, manual therapy, Traction, education, directional preference exercise and home program  Decreased ROM/flexibility - manual therapy, therapeutic exercise and home program  Decreased strength - therapeutic exercise, therapeutic activities and home program  Decreased function - therapeutic activities and home program  STG/LTGs have been met or progress has been made towards goals:  Yes (See Goal flow sheet completed today.)  Assessment of Progress: The patient's condition is improving.  Self Management Plans:  Patient has been instructed in a home treatment program.  I have re-evaluated this patient and  find that the nature, scope, duration and intensity of the therapy is appropriate for the medical condition of the patient.  Colleen continues to require the following intervention to meet STG and LTG's:  PT    Re: Colleen Trivedi   :   1950      Recommendations:  This patient would benefit from continued therapy.     Frequency:  2X a month, once daily  Duration:  for 1 months    Thank you for your referral.    INQUIRIES  Therapist:    Jana Russ [facility]  [facility] PHYSICAL THERAPY  [address] [place] [address]  Phone: [phone]  Fax: [phone]

## 2017-06-21 NOTE — MR AVS SNAPSHOT
"              After Visit Summary   6/21/2017    Colleen Trivedi    MRN: [id number]           Patient Information     Date Of Birth          1950        Visit Information        Provider Department      6/21/2017 8:50 AM Jana Russ PTA [facility] Physical Therapy        Today's Diagnoses     Hip pain, right           Follow-ups after your visit        Your next 10 appointments already scheduled     Jun 28, 2017  8:10 AM CDT   NAZ Extremity with Jana Russ PTA   [facility] Therapy ([facility]  )    [address] [place] [address] [phone]              Who to contact     If you have questions or need follow up information about today's clinic visit or your schedule please contact [facility]IC [facility] PHYSICAL THERAPY directly at [phone].  Normal or non-critical lab and imaging results will be communicated to you by Blaze Medical Deviceshart, letter or phone within 4 business days after the clinic has received the results. If you do not hear from us within 7 days, please contact the clinic through Blaze Medical Deviceshart or phone. If you have a critical or abnormal lab result, we will notify you by phone as soon as possible.  Submit refill requests through [employer] or call your pharmacy and they will forward the refill request to us. Please allow 3 business days for your refill to be completed.          Additional Information About Your Visit        MyChart Information     [employer] lets you send messages to your doctor, view your test results, renew your prescriptions, schedule appointments and more. To sign up, go to [email or web address] . Click on \"Log in\" on the left side of the screen, which will take you to the Welcome page. Then click on \"Sign up Now\" on the right side of the page.     You will be asked to enter the access code listed below, as well as some personal information. Please follow the directions to " create your username and password.     Your access code is: [email or web address]  Expires: 2017  8:51 AM     Your access code will  in 90 days. If you need help or a new code, please call your [place] clinic or [phone].        Care EveryWhere ID     This is your Care EveryWhere ID. This could be used by other organizations to access your [place] medical records  [id number]         Blood Pressure from Last 3 Encounters:   05 110/82    Weight from Last 3 Encounters:   05 96.6 kg (213 lb)              We Performed the Following     Mechanical Traction Therapy     Therapeutic Exercises     Ultrasound Therapy        Primary Care Provider    None , MD       No address on file        Equal Access to Services     : Hadii darrell chakrabortyo Stacy, waaxda luqadaha, qaybta kaalmada adejazyada, modesto toledo . So [facility] [phone].    ATENCIÓN: Si habla español, tiene a huff disposición servicios gratuitos de asistencia lingüística. Llame al [phone].    We comply with applicable federal civil rights laws and [place] laws. We do not discriminate on the basis of race, color, national origin, age, disability sex, sexual orientation or gender identity.            Thank you!     Thank you for choosing [facility] PHYSICAL THERAPY  for your care. Our goal is always to provide you with excellent care. Hearing back from our patients is one way we can continue to improve our services. Please take a few minutes to complete the written survey that you may receive in the mail after your visit with us. Thank you!             Your Updated Medication List - Protect others around you: Learn how to safely use, store and throw away your medicines at www.disposemymeds.org.          This list is accurate as of: 17 11:42 AM.  Always use your most recent med list.                   Brand Name Dispense Instructions for use Diagnosis     labetalol 100 MG tablet    NORMODYNE     Not sure of dosage        LIPITOR 10 MG tablet   Generic drug:  atorvastatin      1 tab PO QD (Once per day)        NORVASC 5 MG tablet   Generic drug:  amLODIPine      Not sure of dosage        triamterene 50 MG capsule    DYRENIUM     Not sure of dosage

## 2017-06-21 NOTE — PROGRESS NOTES
Subjective:    HPI                    Objective:    System    Physical Exam    General     ROS    Assessment/Plan:      PROGRESS  REPORT    Progress reporting period is from 4- to 6-.       SUBJECTIVE  Subjective changes noted by patient:  Subjective: Noticed that 2-3 days last week were bad after missing PT last week. Yesterday was bad to walk 2 blocks, today better.    .      Initial Pain level: 7/10.   Changes in function:  Yes (See Goal flowsheet attached for changes in current functional level)  Adverse reaction to treatment or activity: None    OBJECTIVE  Changes noted in objective findings:    R hamstring 4/5, L 4/5, GLute max 4/5 each.   Difficulty finding posterior pelvic tilt position in 90-90 position.   Best result with R flexion stretches   LUmbar flexion hands to floor, exten WNL, R SB min+ restricted due to R SI region pinching. R rot min restricte. Pt reluctant to see chiro for adjustments       ASSESSMENT/PLAN  Updated problem list and treatment plan: Diagnosis 1:  R hip pain  Pain -  hot/cold therapy, manual therapy, Traction, education, directional preference exercise and home program  Decreased ROM/flexibility - manual therapy, therapeutic exercise and home program  Decreased strength - therapeutic exercise, therapeutic activities and home program  Decreased function - therapeutic activities and home program  STG/LTGs have been met or progress has been made towards goals:  Yes (See Goal flow sheet completed today.)  Assessment of Progress: The patient's condition is improving.  Self Management Plans:  Patient has been instructed in a home treatment program.  I have re-evaluated this patient and find that the nature, scope, duration and intensity of the therapy is appropriate for the medical condition of the patient.  Colleen continues to require the following intervention to meet STG and LTG's:  PT    Recommendations:  This patient would benefit from continued therapy.     Frequency:   2X a month, once daily  Duration:  for 1 months        Please refer to the daily flowsheet for treatment today, total treatment time and time spent performing 1:1 timed codes.

## 2017-06-28 ENCOUNTER — THERAPY VISIT (OUTPATIENT)
Dept: PHYSICAL THERAPY | Facility: CLINIC | Age: 67
End: 2017-06-28
Payer: MEDICARE

## 2017-06-28 DIAGNOSIS — M25.551 HIP PAIN, RIGHT: ICD-10-CM

## 2017-06-28 PROCEDURE — 97530 THERAPEUTIC ACTIVITIES: CPT | Mod: GP | Performed by: PHYSICAL THERAPIST

## 2017-06-28 PROCEDURE — 97110 THERAPEUTIC EXERCISES: CPT | Mod: GP | Performed by: PHYSICAL THERAPIST

## 2017-07-10 ENCOUNTER — THERAPY VISIT (OUTPATIENT)
Dept: PHYSICAL THERAPY | Facility: CLINIC | Age: 67
End: 2017-07-10
Payer: MEDICARE

## 2017-07-10 DIAGNOSIS — M25.551 HIP PAIN, RIGHT: ICD-10-CM

## 2017-07-10 PROCEDURE — 97530 THERAPEUTIC ACTIVITIES: CPT | Mod: GP | Performed by: PHYSICAL THERAPIST

## 2017-07-10 PROCEDURE — 97110 THERAPEUTIC EXERCISES: CPT | Mod: GP | Performed by: PHYSICAL THERAPIST

## 2017-07-10 NOTE — PROGRESS NOTES
Subjective:    HPI                    Objective:    System    Physical Exam    General     ROS    Assessment/Plan:      PROGRESS  REPORT    Progress reporting period is from 6- to 7-.       SUBJECTIVE  Subjective changes noted by patient:  Has been doing the exercises 8-10 x/day - [facility]   Pain is much less and when she starts having pain, she can do her stretch and relieve the pain.   Current Pain level: 2/10.     Initial Pain level: 7/10.   Changes in function:  Yes (See Goal flowsheet attached for changes in current functional level)  Adverse reaction to treatment or activity: None    OBJECTIVE  Changes noted in objective findings:  Yes, incr strength and less pain w/ ROM.  Trunk AROM is WNL all movments - End range pain w/ Ext and SG Rt.   Myotomes symmetrical.  Slump test (-)    ASSESSMENT/PLAN  Updated problem list and treatment plan: Diagnosis 1:  R hip pain  Pain -  hot/cold therapy, education, directional preference exercise and home program  Decreased ROM/flexibility - manual therapy, therapeutic exercise and home program  Decreased function - therapeutic activities and home program  STG/LTGs have been met or progress has been made towards goals:  Yes (See Goal flow sheet completed today.)  Assessment of Progress: The patient's condition is improving.  Self Management Plans:  Patient has been instructed in a home treatment program.  Patient  has been instructed in self management of symptoms.  I have re-evaluated this patient and find that the nature, scope, duration and intensity of the therapy is appropriate for the medical condition of the patient.  Colleen continues to require the following intervention to meet STG and LTG's:  PT    Recommendations:  This patient would benefit from continued therapy.     Frequency:  2 X a month, once daily  Duration:  for 6 weeks - HEP has been streamlined to focus on pain control and will now resume some  strengthening.        Please refer to the daily flowsheet for treatment today, total treatment time and time spent performing 1:1 timed codes.

## 2017-07-10 NOTE — MR AVS SNAPSHOT
"              After Visit Summary   7/10/2017    Colleen Trivedi    MRN: [id number]           Patient Information     Date Of Birth          1950        Visit Information        Provider Department      7/10/2017 10:10 AM Rupal Suazo PT [facility]        Today's Diagnoses     Hip pain, right           Follow-ups after your visit        Your next 10 appointments already scheduled     Jul 24, 2017  9:30 AM CDT   NAZ Extremity with Rupal Suazo PT   [facility] ([facility]  )    [address] [place] [address] [phone]            Aug 14, 2017  9:30 AM CDT   NAZ Extremity with Rupal Suazo PT   [facility] ([address] [address] [phone]              Who to contact     If you have questions or need follow up information about today's clinic visit or your schedule please contact [facility] PHYSICAL THERAPY directly at [phone].  Normal or non-critical lab and imaging results will be communicated to you by Lyticshart, letter or phone within 4 business days after the clinic has received the results. If you do not hear from us within 7 days, please contact the clinic through Lyticshart or phone. If you have a critical or abnormal lab result, we will notify you by phone as soon as possible.  Submit refill requests through 36Kr or call your pharmacy and they will forward the refill request to us. Please allow 3 business days for your refill to be completed.          Additional Information About Your Visit        MyChart Information     36Kr lets you send messages to your doctor, view your test results, renew your prescriptions, schedule appointments and more. To sign up, go to [email or web address] . Click on \"Log in\" on the left side of the " "screen, which will take you to the Welcome page. Then click on \"Sign up Now\" on the right side of the page.     You will be asked to enter the access code listed below, as well as some personal information. Please follow the directions to create your username and password.     Your access code is: [email or web address]  Expires: 2017  8:51 AM     Your access code will  in 90 days. If you need help or a new code, please call your [place] clinic or [phone].        Care EveryWhere ID     This is your Care EveryWhere ID. This could be used by other organizations to access your [place] medical records  [id number]         Blood Pressure from Last 3 Encounters:   05 110/82    Weight from Last 3 Encounters:   05 96.6 kg (213 lb)              We Performed the Following     NAZ RE-CERT REPORT     THERAPEUTIC ACTIVITIES     THERAPEUTIC EXERCISES        Primary Care Provider    None Doctor, MD       No address on file        Equal Access to Services     [facility]: Hadii aad ku hadasho Soomaali, waaxda luqadaha, qaybta kaalmada adeegyada, waxay maritzain darci toledo . So [facility] [phone].    ATENCIÓN: Si habla español, tiene a huff disposición servicios gratuitos de asistencia lingüística. Llame al [phone].    We comply with applicable federal civil rights laws and [place] laws. We do not discriminate on the basis of race, color, national origin, age, disability sex, sexual orientation or gender identity.            Thank you!     Thank you for choosing [facility]  for your care. Our goal is always to provide you with excellent care. Hearing back from our patients is one way we can continue to improve our services. Please take a few minutes to complete the written survey that you may receive in the mail after your visit with us. Thank you!             Your Updated Medication List - Protect others around you: Learn how to safely use, " store and throw away your medicines at www.disposemymeds.org.          This list is accurate as of: 7/10/17 10:56 AM.  Always use your most recent med list.                   Brand Name Dispense Instructions for use Diagnosis    labetalol 100 MG tablet    NORMODYNE     Not sure of dosage        LIPITOR 10 MG tablet   Generic drug:  atorvastatin      1 tab PO QD (Once per day)        NORVASC 5 MG tablet   Generic drug:  amLODIPine      Not sure of dosage        triamterene 50 MG capsule    DYRENIUM     Not sure of dosage

## 2017-07-10 NOTE — LETTER
DEPARTMENT OF HEALTH AND HUMAN SERVICES  CENTERS FOR MEDICARE & MEDICAID SERVICES    PLAN/UPDATED PLAN OF PROGRESS FOR OUTPATIENT REHABILITATION  PATIENTS NAME:  Colleen Trivedi   : 1950  PROVIDER NUMBER:    [id number]  [id number]   PROVIDER NAME: [facility]  MEDICAL RECORD NUMBER: [id number]   START OF CARE DATE:  SOC Date: 17   TYPE:  PT  PRIMARY/TREATMENT DIAGNOSIS: (Pertinent Medical Diagnosis)  Hip pain, right  VISITS FROM START OF CARE:  Rxs Used: 12     PROGRESS  REPORT  Progress reporting period is from 2017 to 7-.       SUBJECTIVE  Subjective changes noted by patient:  Has been doing the exercises 8-10 x/day - [facility] morning and evening and [facility] throughtout the day.   Pain is much less and when she starts having pain, she can do her stretch and relieve the pain.   Current Pain level: 2/10.     Initial Pain level: 7/10.   Changes in function:  Yes (See Goal flowsheet attached for changes in current functional level)  Adverse reaction to treatment or activity: None    OBJECTIVE  Changes noted in objective findings:  Yes, incr strength and less pain w/ ROM.  Trunk AROM is WNL all movments - End range pain w/ Ext and SG Rt.   Myotomes symmetrical.  Slump test (-)    ASSESSMENT/PLAN  Updated problem list and treatment plan: Diagnosis 1:  R hip pain  Pain -  hot/cold therapy, education, directional preference exercise and home program  Decreased ROM/flexibility - manual therapy, therapeutic exercise and home program  Decreased function - therapeutic activities and home program  STG/LTGs have been met or progress has been made towards goals:  Yes (See Goal flow sheet completed today.)  Assessment of Progress: The patient's condition is improving.  Self Management Plans:  Patient has been instructed in a home treatment program.  Patient  has been instructed in self management of symptoms.  I have re-evaluated this patient and find that  "the nature, scope, duration and intensity of the therapy is appropriate for the medical condition of the patient.  Colleen continues to require the following intervention to meet STG and LTG's:  PT    Recommendations:  This patient would benefit from continued therapy.     Frequency:  2 X a month, once daily  Duration:  for 6 weeks - HEP has been streamlined to focus on pain control and will now resume some strengthening.    Caregiver Signature/Credentials _____________________________ Date ________      Treating Provider: JENNIFER Suazo PT, cert MDT   I have reviewed and certified the need for these services and plan of treatment while under my care.      PHYSICIAN'S SIGNATURE:   _________________________________________  Date___________    Yoni Brandon  Certification period:  Beginning of Cert date period: 07/10/17 to  End of Cert period date: 09/08/17     Functional Level Progress Report: Please see attached \"Goal Flow sheet for Functional level.\"    ____X____ Continue Services or       ________ DC Services            Service dates: From  SOC Date: 04/12/17 date to present                         "

## 2017-07-21 ASSESSMENT — ACTIVITIES OF DAILY LIVING (ADL)
GO DOWN STAIRS: ACTIVITY IS MINIMALLY DIFFICULT
AS_A_RESULT_OF_YOUR_KNEE_INJURY,_HOW_WOULD_YOU_RATE_YOUR_CURRENT_LEVEL_OF_DAILY_ACTIVITY?: NEARLY NORMAL
STIFFNESS: I DO NOT HAVE THE SYMPTOM
HOW_WOULD_YOU_RATE_THE_OVERALL_FUNCTION_OF_YOUR_KNEE_DURING_YOUR_USUAL_DAILY_ACTIVITIES?: NEARLY NORMAL
KNEEL ON THE FRONT OF YOUR KNEE: ACTIVITY IS NOT DIFFICULT
WALK: ACTIVITY IS NOT DIFFICULT
STAND: ACTIVITY IS NOT DIFFICULT
KNEE_ACTIVITY_OF_DAILY_LIVING_SUM: 65
LIMPING: I DO NOT HAVE THE SYMPTOM
SQUAT: ACTIVITY IS NOT DIFFICULT
GIVING WAY, BUCKLING OR SHIFTING OF KNEE: I DO NOT HAVE THE SYMPTOM
KNEE_ACTIVITY_OF_DAILY_LIVING_SCORE: 92.86
HOW_WOULD_YOU_RATE_THE_CURRENT_FUNCTION_OF_YOUR_KNEE_DURING_YOUR_USUAL_DAILY_ACTIVITIES_ON_A_SCALE_FROM_0_TO_100_WITH_100_BEING_YOUR_LEVEL_OF_KNEE_FUNCTION_PRIOR_TO_YOUR_INJURY_AND_0_BEING_THE_INABILITY_TO_PERFORM_ANY_OF_YOUR_USUAL_DAILY_ACTIVITIES?: 95
RAW_SCORE: 65
SIT WITH YOUR KNEE BENT: ACTIVITY IS NOT DIFFICULT
WEAKNESS: THE SYMPTOM AFFECTS MY ACTIVITY SLIGHTLY
SWELLING: I DO NOT HAVE THE SYMPTOM
PAIN: I DO NOT HAVE THE SYMPTOM
GO UP STAIRS: ACTIVITY IS SOMEWHAT DIFFICULT
RISE FROM A CHAIR: ACTIVITY IS NOT DIFFICULT

## 2017-07-24 ENCOUNTER — THERAPY VISIT (OUTPATIENT)
Dept: PHYSICAL THERAPY | Facility: CLINIC | Age: 67
End: 2017-07-24
Payer: MEDICARE

## 2017-07-24 DIAGNOSIS — M25.551 HIP PAIN, RIGHT: ICD-10-CM

## 2017-07-24 PROCEDURE — G8980 MOBILITY D/C STATUS: HCPCS | Mod: GP | Performed by: PHYSICAL THERAPIST

## 2017-07-24 PROCEDURE — 97110 THERAPEUTIC EXERCISES: CPT | Mod: GP | Performed by: PHYSICAL THERAPIST

## 2017-07-24 PROCEDURE — G8979 MOBILITY GOAL STATUS: HCPCS | Mod: GP | Performed by: PHYSICAL THERAPIST

## 2017-07-24 PROCEDURE — 97530 THERAPEUTIC ACTIVITIES: CPT | Mod: GP | Performed by: PHYSICAL THERAPIST

## 2017-07-24 PROCEDURE — G8978 MOBILITY CURRENT STATUS: HCPCS | Mod: GP | Performed by: PHYSICAL THERAPIST

## 2017-07-24 NOTE — PROGRESS NOTES
Subjective:    HPI                    Objective:    System    Physical Exam    General     ROS    Assessment/Plan:      DISCHARGE REPORT    Progress reporting period is from 7-10-17 to 7-24-17.       SUBJECTIVE  Subjective changes noted by patient:  Has been doing well.  Can walk in community for 20-25 min and be in the Kitchen for 20+ min without pain.  Pt reports that if she feels the onset of the pain, she can stretch and relieve the pain.    Current Pain level: 0/10.     Initial Pain level: 7/10.   Changes in function:  Yes (See Goal flowsheet attached for changes in current functional level)  Adverse reaction to treatment or activity: None    OBJECTIVE  Changes noted in objective findings:  Yes, hip and trunk ROM and strength improved  Trunk AROM is WNL all movements.  Slight pulling in LB with Sideglide Rt.    Rt hip ROM is symmetrical.    Hip strength and myotomes WNL.       ASSESSMENT/PLAN  Updated problem list and treatment plan: Diagnosis 1:  R hip pain    STG/LTGs have been met or progress has been made towards goals:  Yes (See Goal flow sheet completed today.)  Assessment of Progress: The patient has met all of their long term goals.  Self Management Plans:  Patient is independent in a home treatment program.    Colleen continues to require the following intervention to meet STG and LTG's:  PT intervention is no longer required to meet STG/LTG.    Recommendations:  This patient is ready to be discharged from therapy and continue their home treatment program.    Please refer to the daily flowsheet for treatment today, total treatment time and time spent performing 1:1 timed codes.

## 2017-07-24 NOTE — LETTER
[facility]   [address] [address]  [phone]    2017    Re: Colleen Trivedi   :   1950  MRN:  [id number]   REFERRING PHYSICIAN:   Yoni Brandon    [facility]    Date of Initial Evaluation:  2017  Visits:  Rxs Used: 13  Reason for Referral:  Hip pain, right    DISCHARGE REPORT    Progress reporting period is from 7-10-17 to 17.       SUBJECTIVE  Subjective changes noted by patient:  Has been doing well.  Can walk in community for 20-25 min and be in the Kitchen for 20+ min without pain.  Pt reports that if she feels the onset of the pain, she can stretch and relieve the pain.    Current Pain level: 0/10.     Initial Pain level: 7/10.   Changes in function:  Yes (See Goal flowsheet attached for changes in current functional level)  Adverse reaction to treatment or activity: None    OBJECTIVE  Changes noted in objective findings:  Yes, hip and trunk ROM and strength improved  Trunk AROM is WNL all movements.  Slight pulling in LB with Sideglide Rt.    Rt hip ROM is symmetrical.    Hip strength and myotomes WNL.       ASSESSMENT/PLAN  Updated problem list and treatment plan: Diagnosis 1:  R hip pain    STG/LTGs have been met or progress has been made towards goals:  Yes (See Goal flow sheet completed today.)  Assessment of Progress: The patient has met all of their long term goals.  Self Management Plans:  Patient is independent in a home treatment program.    Colleen continues to require the following intervention to meet STG and LTG's:  PT intervention is no longer required to meet STG/LTG.    Recommendations:  This patient is ready to be discharged from therapy and continue their home treatment program.          Thank you for your referral.    INQUIRIES  Therapist: Rupal Cross, PT    [facility]  [facility]  [address] [address]  Phone: [phone]  Fax: [phone]

## 2017-07-24 NOTE — MR AVS SNAPSHOT
"              After Visit Summary   2017    Colleen Trivedi    MRN: [id number]           Patient Information     Date Of Birth          1950        Visit Information        Provider Department      2017 9:30 AM Rupal Suazo PT [facility]        Today's Diagnoses     Hip pain, right           Follow-ups after your visit        Who to contact     If you have questions or need follow up information about today's clinic visit or your schedule please [facility] directly at [phone].  Normal or non-critical lab and imaging results will be communicated to you by Spriohart, letter or phone within 4 business days after the clinic has received the results. If you do not hear from us within 7 days, please contact the clinic through Spriohart or phone. If you have a critical or abnormal lab result, we will notify you by phone as soon as possible.  Submit refill requests through iPositioning or call your pharmacy and they will forward the refill request to us. Please allow 3 business days for your refill to be completed.          Additional Information About Your Visit        MyChart Information     iPositioning lets you send messages to your doctor, view your test results, renew your prescriptions, schedule appointments and more. To sign up, go to [email or web address] . Click on \"Log in\" on the left side of the screen, which will take you to the Welcome page. Then click on \"Sign up Now\" on the right side of the page.     You will be asked to enter the access code listed below, as well as some personal information. Please follow the directions to create your username and password.     Your access code is: [email or web address]  Expires: 10/22/2017 10:08 AM     Your access code will  in 90 days. If you need help or a new code, please call your [place] clinic or [phone].        Care EveryWhere ID     This " is your Care EveryWhere ID. This could be used by other organizations to access your [place] medical records  [id number]         Blood Pressure from Last 3 Encounters:   01/17/05 110/82    Weight from Last 3 Encounters:   01/17/05 96.6 kg (213 lb)              We Performed the Following     NAZ PROGRESS NOTES REPORT     THERAPEUTIC ACTIVITIES     THERAPEUTIC EXERCISES        Primary Care Provider    None Doctor, MD       No address on file        Equal Access to Services     [facility]: Hadii aad ku hadasho Soomaali, waaxda luqadaha, qaybta kaalmada adeegyada, waxay idiin hayaan adeeg harry la'aan . So [facility] [phone].    ATENCIÓN: Si habla español, tiene a huff disposición servicios gratuitos de asistencia lingüística. Llame al [phone].    We comply with applicable federal civil rights laws and [place] laws. We do not discriminate on the basis of race, color, national origin, age, disability sex, sexual orientation or gender identity.            Thank you!     Thank you for choosing [place] [facility]  for your care. Our goal is always to provide you with excellent care. Hearing back from our patients is one way we can continue to improve our services. Please take a few minutes to complete the written survey that you may receive in the mail after your visit with us. Thank you!             Your Updated Medication List - Protect others around you: Learn how to safely use, store and throw away your medicines at www.disposemymeds.org.          This list is accurate as of: 7/24/17 10:08 AM.  Always use your most recent med list.                   Brand Name Dispense Instructions for use Diagnosis    labetalol 100 MG tablet    NORMODYNE     Not sure of dosage        LIPITOR 10 MG tablet   Generic drug:  atorvastatin      1 tab PO QD (Once per day)        NORVASC 5 MG tablet   Generic drug:  amLODIPine      Not sure of dosage        triamterene 50 MG capsule     DYRENIUM     Not sure of dosage

## 2021-05-27 ENCOUNTER — RECORDS - HEALTHEAST (OUTPATIENT)
Dept: ADMINISTRATIVE | Facility: CLINIC | Age: 71
End: 2021-05-27